# Patient Record
Sex: MALE | Race: WHITE | NOT HISPANIC OR LATINO | Employment: FULL TIME | URBAN - METROPOLITAN AREA
[De-identification: names, ages, dates, MRNs, and addresses within clinical notes are randomized per-mention and may not be internally consistent; named-entity substitution may affect disease eponyms.]

---

## 2019-03-02 ENCOUNTER — HOSPITAL ENCOUNTER (EMERGENCY)
Facility: HOSPITAL | Age: 59
Discharge: HOME | End: 2019-03-02
Attending: EMERGENCY MEDICINE
Payer: COMMERCIAL

## 2019-03-02 VITALS
HEIGHT: 71 IN | BODY MASS INDEX: 31.5 KG/M2 | RESPIRATION RATE: 18 BRPM | OXYGEN SATURATION: 96 % | TEMPERATURE: 99 F | SYSTOLIC BLOOD PRESSURE: 146 MMHG | WEIGHT: 225 LBS | HEART RATE: 67 BPM | DIASTOLIC BLOOD PRESSURE: 85 MMHG

## 2019-03-02 DIAGNOSIS — R55 FAINTING: ICD-10-CM

## 2019-03-02 DIAGNOSIS — R55 SYNCOPE: ICD-10-CM

## 2019-03-02 DIAGNOSIS — R42 DIZZINESS: ICD-10-CM

## 2019-03-02 LAB
ALBUMIN SERPL BCP-MCNC: 3.7 G/DL
ALP SERPL-CCNC: 64 U/L
ALT SERPL W/O P-5'-P-CCNC: 29 U/L
ANION GAP SERPL CALC-SCNC: 8 MMOL/L
APTT BLDCRRT: 29.1 SEC
AST SERPL-CCNC: 23 U/L
BASOPHILS # BLD AUTO: 0.01 K/UL
BASOPHILS NFR BLD: 0.1 %
BILIRUB SERPL-MCNC: 0.6 MG/DL
BUN SERPL-MCNC: 14 MG/DL
CALCIUM SERPL-MCNC: 10.1 MG/DL
CHLORIDE SERPL-SCNC: 103 MMOL/L
CO2 SERPL-SCNC: 29 MMOL/L
CREAT SERPL-MCNC: 1 MG/DL
DIFFERENTIAL METHOD: ABNORMAL
EOSINOPHIL # BLD AUTO: 0.2 K/UL
EOSINOPHIL NFR BLD: 3.1 %
ERYTHROCYTE [DISTWIDTH] IN BLOOD BY AUTOMATED COUNT: 13.4 %
EST. GFR  (AFRICAN AMERICAN): >60 ML/MIN/1.73 M^2
EST. GFR  (NON AFRICAN AMERICAN): >60 ML/MIN/1.73 M^2
GLUCOSE SERPL-MCNC: 93 MG/DL
HCT VFR BLD AUTO: 47.6 %
HGB BLD-MCNC: 16.3 G/DL
INR PPP: 0.9
LYMPHOCYTES # BLD AUTO: 2.1 K/UL
LYMPHOCYTES NFR BLD: 26.5 %
MCH RBC QN AUTO: 32.7 PG
MCHC RBC AUTO-ENTMCNC: 34.2 G/DL
MCV RBC AUTO: 95 FL
MONOCYTES # BLD AUTO: 0.8 K/UL
MONOCYTES NFR BLD: 10 %
NEUTROPHILS # BLD AUTO: 4.7 K/UL
NEUTROPHILS NFR BLD: 60.3 %
PLATELET # BLD AUTO: 260 K/UL
PMV BLD AUTO: 9.4 FL
POTASSIUM SERPL-SCNC: 3.7 MMOL/L
PROT SERPL-MCNC: 7.1 G/DL
PROTHROMBIN TIME: 10 SEC
RBC # BLD AUTO: 4.99 M/UL
SODIUM SERPL-SCNC: 140 MMOL/L
TROPONIN I SERPL DL<=0.01 NG/ML-MCNC: <0.006 NG/ML
WBC # BLD AUTO: 7.81 K/UL

## 2019-03-02 PROCEDURE — 84484 ASSAY OF TROPONIN QUANT: CPT

## 2019-03-02 PROCEDURE — 93010 EKG 12-LEAD: ICD-10-PCS | Mod: ,,, | Performed by: INTERNAL MEDICINE

## 2019-03-02 PROCEDURE — 85730 THROMBOPLASTIN TIME PARTIAL: CPT

## 2019-03-02 PROCEDURE — 80053 COMPREHEN METABOLIC PANEL: CPT

## 2019-03-02 PROCEDURE — 85610 PROTHROMBIN TIME: CPT

## 2019-03-02 PROCEDURE — 85025 COMPLETE CBC W/AUTO DIFF WBC: CPT

## 2019-03-02 PROCEDURE — 93005 ELECTROCARDIOGRAM TRACING: CPT

## 2019-03-02 PROCEDURE — 93010 ELECTROCARDIOGRAM REPORT: CPT | Mod: ,,, | Performed by: INTERNAL MEDICINE

## 2019-03-02 PROCEDURE — 99285 EMERGENCY DEPT VISIT HI MDM: CPT | Mod: 25

## 2019-03-02 RX ORDER — LOSARTAN POTASSIUM AND HYDROCHLOROTHIAZIDE 12.5; 1 MG/1; MG/1
1 TABLET ORAL DAILY
COMMUNITY
Start: 2018-10-02 | End: 2019-03-31

## 2019-03-02 NOTE — ED PROVIDER NOTES
SCRIBE #1 NOTE: I, Saulo Zazueta, am scribing for, and in the presence of, Paul Smith Jr., MD. I have scribed the entire note.       History     Chief Complaint   Patient presents with    Dizziness     pt states he passed out last night and is unsure why, ambulance came did ekg and and provided o2, pt states he still doesnt feel right; wife states pt was unresponsive and not breathing; episode occurred after intercourse     Review of patient's allergies indicates:  No Known Allergies      History of Present Illness     HPI    3/2/2019, 11:55 AM  History obtained from the patient      History of Present Illness: Dennis Eric Lefort is a 59 y.o. male patient who presents to the Emergency Department for evaluation of a syncopal episode which occurred last PM. Pt reports that he and his wife were sitting out on there porch smoking cigarettes when the episode occurred. They had eaten dinner and had intercourse approximately an hour before this. Wife reports that pt fell down and was completely unresponsive during this episode. Pt is unsure if he completely lost consciousness as he can remember her telling him to breath. Pt reportedly became very lightheaded and pale before onset. An ambulance was called and he was given O2 and had an EKG, but he states that he still does not feel right. Symptoms are episodic and moderate in severity. No mitigating or exacerbating factors reported. Associated sxs include HA when he woke up this AM. Patient denies any numbness, weakness, CP, SOB, current lightheadedness/dizziness, fever, chills, and all other sxs at this time. No further complaints or concerns at this time.         Arrival mode: Personal vehicle    PCP: Primary Doctor No        Past Medical History:  History reviewed. No pertinent past medical history.    Past Surgical History:  History reviewed. No pertinent surgical history.      Family History:  History reviewed. No pertinent family history.    Social  History:  Social History     Tobacco Use    Smoking status: Current Every Day Smoker     Packs/day: 0.50   Substance and Sexual Activity    Alcohol use: No    Drug use: No    Sexual activity: Unknown        Review of Systems     Review of Systems   Constitutional: Negative for chills and fever.   HENT: Negative for sore throat.    Respiratory: Negative for shortness of breath.    Cardiovascular: Negative for chest pain.   Gastrointestinal: Negative for abdominal pain, diarrhea, nausea and vomiting.   Genitourinary: Negative for dysuria.   Musculoskeletal: Negative for back pain.   Skin: Negative for rash.   Neurological: Positive for syncope and headaches. Negative for dizziness, facial asymmetry, speech difficulty, weakness, light-headedness and numbness.   Hematological: Does not bruise/bleed easily.   All other systems reviewed and are negative.     Physical Exam     Initial Vitals [03/02/19 1148]   BP Pulse Resp Temp SpO2   (!) 161/88 68 18 98.7 °F (37.1 °C) 98 %      MAP       --          Physical Exam  Nursing Notes and Vital Signs Reviewed.  Constitutional: Patient is in no acute distress. Well-developed and well-nourished.  Head: Atraumatic. Normocephalic.  Eyes: PERRL. EOM intact. Conjunctivae are not pale. No scleral icterus.  ENT: Mucous membranes are moist. Oropharynx is clear and symmetric.    Neck: Supple. Full ROM. No lymphadenopathy.  Cardiovascular: Regular rate. Regular rhythm. No murmurs, rubs, or gallops. Distal pulses are 2+ and symmetric.  Pulmonary/Chest: No respiratory distress. Clear to auscultation bilaterally. No wheezing or rales.  Abdominal: Soft and non-distended.  There is no tenderness.  No rebound, guarding, or rigidity.   Musculoskeletal: Moves all extremities. No obvious deformities. No edema. No calf tenderness.  Skin: Warm and dry.  Neurological:  Alert, awake, and appropriate.  Normal speech.  No acute focal neurological deficits are appreciated.  Psychiatric: Normal affect.  "Good eye contact. Appropriate in content.     ED Course   Procedures  ED Vital Signs:  Vitals:    03/02/19 1148 03/02/19 1202 03/02/19 1210 03/02/19 1211   BP: (!) 161/88  (!) 152/91 (!) 159/90   Pulse: 68 64 64 64   Resp: 18 16 18   Temp: 98.7 °F (37.1 °C)      TempSrc: Oral      SpO2: 98%  96% 96%   Weight: 102.1 kg (225 lb)      Height: 5' 11" (1.803 m)       03/02/19 1212 03/02/19 1301 03/02/19 1341   BP: (!) 143/80 136/85    Pulse: 69 62 61   Resp: 19 17 16   Temp:      TempSrc:      SpO2: 96% 96% 97%   Weight:      Height:          Abnormal Lab Results:  Labs Reviewed   CBC W/ AUTO DIFFERENTIAL - Abnormal; Notable for the following components:       Result Value    MCH 32.7 (*)     All other components within normal limits   COMPREHENSIVE METABOLIC PANEL   PROTIME-INR   APTT   TROPONIN I   URINALYSIS        All Lab Results:  Results for orders placed or performed during the hospital encounter of 03/02/19   CBC auto differential   Result Value Ref Range    WBC 7.81 3.90 - 12.70 K/uL    RBC 4.99 4.60 - 6.20 M/uL    Hemoglobin 16.3 14.0 - 18.0 g/dL    Hematocrit 47.6 40.0 - 54.0 %    MCV 95 82 - 98 fL    MCH 32.7 (H) 27.0 - 31.0 pg    MCHC 34.2 32.0 - 36.0 g/dL    RDW 13.4 11.5 - 14.5 %    Platelets 260 150 - 350 K/uL    MPV 9.4 9.2 - 12.9 fL    Gran # (ANC) 4.7 1.8 - 7.7 K/uL    Lymph # 2.1 1.0 - 4.8 K/uL    Mono # 0.8 0.3 - 1.0 K/uL    Eos # 0.2 0.0 - 0.5 K/uL    Baso # 0.01 0.00 - 0.20 K/uL    Gran% 60.3 38.0 - 73.0 %    Lymph% 26.5 18.0 - 48.0 %    Mono% 10.0 4.0 - 15.0 %    Eosinophil% 3.1 0.0 - 8.0 %    Basophil% 0.1 0.0 - 1.9 %    Differential Method Automated    Comprehensive metabolic panel   Result Value Ref Range    Sodium 140 136 - 145 mmol/L    Potassium 3.7 3.5 - 5.1 mmol/L    Chloride 103 95 - 110 mmol/L    CO2 29 23 - 29 mmol/L    Glucose 93 70 - 110 mg/dL    BUN, Bld 14 6 - 20 mg/dL    Creatinine 1.0 0.5 - 1.4 mg/dL    Calcium 10.1 8.7 - 10.5 mg/dL    Total Protein 7.1 6.0 - 8.4 g/dL    Albumin " 3.7 3.5 - 5.2 g/dL    Total Bilirubin 0.6 0.1 - 1.0 mg/dL    Alkaline Phosphatase 64 55 - 135 U/L    AST 23 10 - 40 U/L    ALT 29 10 - 44 U/L    Anion Gap 8 8 - 16 mmol/L    eGFR if African American >60 >60 mL/min/1.73 m^2    eGFR if non African American >60 >60 mL/min/1.73 m^2   Protime-INR   Result Value Ref Range    Prothrombin Time 10.0 9.0 - 12.5 sec    INR 0.9 0.8 - 1.2   APTT   Result Value Ref Range    aPTT 29.1 21.0 - 32.0 sec   Troponin I   Result Value Ref Range    Troponin I <0.006 0.000 - 0.026 ng/mL         Imaging Results:  Imaging Results          CT Head Without Contrast (Final result)  Result time 03/02/19 12:47:35    Final result by LAURA Yañez Sr., MD (03/02/19 12:47:35)                 Impression:      1. No intracranial abnormality.  2. There is mild partial opacification of the posterior aspect of the right ethmoidal sinus.  This is characteristic of sinusitis.  All CT scans at this facility use dose modulation, iterative reconstruction, and/or weight base dosing when appropriate to reduce radiation dose when appropriate to reduce radiation dose to as low as reasonably achievable.      Electronically signed by: Slava Yañez MD  Date:    03/02/2019  Time:    12:47             Narrative:    EXAMINATION:  CT HEAD WITHOUT CONTRAST    CLINICAL HISTORY:  Syncope/fainting;    TECHNIQUE:  Standard brain CT protocol without IV contrast was performed.    COMPARISON:  None    FINDINGS:  The ventricles have a normal size, position, and appearance. There is no abnormal intracranial mass or intracranial hemorrhage. There is no skull fracture.  There is mild partial opacification of the posterior aspect of the right ethmoidal sinus.                               X-Ray Chest AP Portable (Final result)  Result time 03/02/19 12:23:13    Final result by LAURA Yañez Sr., MD (03/02/19 12:23:13)                 Impression:      Normal study.      Electronically signed by: Slava Yañez  MD  Date:    03/02/2019  Time:    12:23             Narrative:    EXAMINATION:  XR CHEST AP PORTABLE    CLINICAL HISTORY:  Syncope and collapse    COMPARISON:  11/26/2015    FINDINGS:  The size of the heart is normal. The lungs are clear. There is no pneumothorax.  The costophrenic angles are sharp.                                 The EKG was ordered, reviewed, and independently interpreted by the ED provider.  Interpretation time: 1155  Rate: 67 BPM  Rhythm: normal sinus rhythm  Interpretation: No acute ST changes. No STEMI.           The Emergency Provider reviewed the vital signs and test results, which are outlined above.     ED Discussion     1:57 PM: Reassessed pt at this time.  Pt states his condition has improved at this time. Discussed with pt all pertinent ED information and results. Discussed pt dx  and plan of tx. Gave pt all f/u and return to the ED instructions. All questions and concerns were addressed at this time. Pt expresses understanding of information and instructions, and is comfortable with plan to discharge. Pt is stable for discharge.    I discussed with patient and/or family/caretaker that evaluation in the ED does not suggest any emergent or life threatening medical conditions requiring immediate intervention beyond what was provided in the ED, and I believe patient is safe for discharge.  Regardless, an unremarkable evaluation in the ED does not preclude the development or presence of a serious of life threatening condition. As such, patient was instructed to return immediately for any worsening or change in current symptoms.      ED Medication(s):  Medications - No data to display    New Prescriptions    No medications on file       Follow-up Information     Adarsh Ruth MD. Call in 2 days.    Specialties:  Cardiology, INTERVENTIONAL CARDIOLOGY  Why:  Call Dr. Ruth- cardiolgoy clinic here at [facility] to schedule appt for recheck and fureher evaluation after fainting episode  yestedrday  Contact information:  [address]  [place] [address]  [phone]             [facility] - .    Specialty:  Emergency Medicine  Why:  If symptoms worsen any further episodes of fainting  Contact information:  [address]  [facility] [address] [phone]                       Medical Decision Making:   Clinical Tests:   Lab Tests: Ordered and Reviewed  Radiological Study: Ordered and Reviewed  Medical Tests: Ordered and Reviewed             Scribe Attestation:   Scribe #1: I performed the above scribed service and the documentation accurately describes the services I performed. I attest to the accuracy of the note.     Attending:   Physician Attestation Statement for Scribe #1: I, Paul Smith Jr., MD, personally performed the services described in this documentation, as scribed by Saulo Zazueta, in my presence, and it is both accurate and complete.           Clinical Impression       ICD-10-CM ICD-9-CM   1. Dizziness R42 780.4   2. Syncope R55 780.2   3. Fainting R55 780.2       Disposition:   Disposition: Discharged  Condition: Stable         Paul Smith Jr., MD  03/02/19 1442